# Patient Record
Sex: FEMALE | Employment: UNEMPLOYED | URBAN - METROPOLITAN AREA
[De-identification: names, ages, dates, MRNs, and addresses within clinical notes are randomized per-mention and may not be internally consistent; named-entity substitution may affect disease eponyms.]

---

## 2018-08-06 ENCOUNTER — HOSPITAL ENCOUNTER (INPATIENT)
Facility: CLINIC | Age: 28
LOS: 1 days | Discharge: LEFT BEFORE DISCHARGE | DRG: 894 | End: 2018-08-07
Attending: EMERGENCY MEDICINE | Admitting: PSYCHIATRY & NEUROLOGY

## 2018-08-06 DIAGNOSIS — F15.20 METHAMPHETAMINE DEPENDENCE (H): ICD-10-CM

## 2018-08-06 DIAGNOSIS — F11.20 SEVERE HEROIN DEPENDENCE (H): ICD-10-CM

## 2018-08-06 PROBLEM — F19.20 CHEMICAL DEPENDENCY (H): Status: ACTIVE | Noted: 2018-08-06

## 2018-08-06 LAB
AMPHETAMINES UR QL SCN: POSITIVE
BARBITURATES UR QL: NEGATIVE
BENZODIAZ UR QL: NEGATIVE
CANNABINOIDS UR QL SCN: POSITIVE
COCAINE UR QL: NEGATIVE
ETHANOL UR QL SCN: NEGATIVE
HCG UR QL: NEGATIVE
OPIATES UR QL SCN: POSITIVE

## 2018-08-06 PROCEDURE — 12800012 ZZH R&B CD MH INTERMEDIATE ADULT

## 2018-08-06 PROCEDURE — 80307 DRUG TEST PRSMV CHEM ANLYZR: CPT | Performed by: EMERGENCY MEDICINE

## 2018-08-06 PROCEDURE — 99284 EMERGENCY DEPT VISIT MOD MDM: CPT | Mod: Z6 | Performed by: EMERGENCY MEDICINE

## 2018-08-06 PROCEDURE — 99285 EMERGENCY DEPT VISIT HI MDM: CPT | Mod: 25 | Performed by: EMERGENCY MEDICINE

## 2018-08-06 PROCEDURE — 80320 DRUG SCREEN QUANTALCOHOLS: CPT | Performed by: EMERGENCY MEDICINE

## 2018-08-06 PROCEDURE — 81025 URINE PREGNANCY TEST: CPT | Performed by: EMERGENCY MEDICINE

## 2018-08-06 RX ORDER — TRAZODONE HYDROCHLORIDE 50 MG/1
50 TABLET, FILM COATED ORAL
Status: DISCONTINUED | OUTPATIENT
Start: 2018-08-06 | End: 2018-08-07 | Stop reason: HOSPADM

## 2018-08-06 RX ORDER — BISACODYL 10 MG
10 SUPPOSITORY, RECTAL RECTAL DAILY PRN
Status: DISCONTINUED | OUTPATIENT
Start: 2018-08-06 | End: 2018-08-07 | Stop reason: HOSPADM

## 2018-08-06 RX ORDER — ALUMINA, MAGNESIA, AND SIMETHICONE 2400; 2400; 240 MG/30ML; MG/30ML; MG/30ML
30 SUSPENSION ORAL EVERY 4 HOURS PRN
Status: DISCONTINUED | OUTPATIENT
Start: 2018-08-06 | End: 2018-08-07 | Stop reason: HOSPADM

## 2018-08-06 RX ORDER — ACETAMINOPHEN 325 MG/1
650 TABLET ORAL EVERY 4 HOURS PRN
Status: DISCONTINUED | OUTPATIENT
Start: 2018-08-06 | End: 2018-08-07 | Stop reason: HOSPADM

## 2018-08-06 RX ORDER — IBUPROFEN 600 MG/1
600 TABLET, FILM COATED ORAL EVERY 6 HOURS PRN
Status: DISCONTINUED | OUTPATIENT
Start: 2018-08-06 | End: 2018-08-07 | Stop reason: HOSPADM

## 2018-08-06 RX ORDER — HYDROXYZINE HYDROCHLORIDE 25 MG/1
25 TABLET, FILM COATED ORAL EVERY 4 HOURS PRN
Status: DISCONTINUED | OUTPATIENT
Start: 2018-08-06 | End: 2018-08-07 | Stop reason: HOSPADM

## 2018-08-06 RX ORDER — ONDANSETRON 4 MG/1
4 TABLET, ORALLY DISINTEGRATING ORAL EVERY 6 HOURS PRN
Status: DISCONTINUED | OUTPATIENT
Start: 2018-08-06 | End: 2018-08-07 | Stop reason: HOSPADM

## 2018-08-06 ASSESSMENT — ACTIVITIES OF DAILY LIVING (ADL)
GROOMING: INDEPENDENT;HANDWASHING
ORAL_HYGIENE: INDEPENDENT

## 2018-08-06 ASSESSMENT — ENCOUNTER SYMPTOMS: PSYCHIATRIC NEGATIVE: 1

## 2018-08-06 NOTE — ED NOTES
ED to Behavioral Floor Handoff    SITUATION  Zuly Pearson is a 28 year old female who speaks English and lives in a home unknown The patient arrived in the ED by private car from home with a complaint of Addiction Problem (Detox from meth and heroin)  .The patient's current symptoms started/worsened 1 year(s) ago and during this time the symptoms have increased.   In the ED, pt was diagnosed with   Final diagnoses:   Severe heroin dependence (H)   Methamphetamine dependence (H)        Initial vitals were: BP: 128/77  Pulse: 79  Temp: 96.4  F (35.8  C)  Resp: 12  Weight: 88 kg (194 lb 1.6 oz)  SpO2: 98 %   --------  Is the patient diabetic? No   If yes, last blood glucose? --     If yes, was this treated in the ED? --  --------  Is the patient inebriated (ETOH) No or Impaired on other substances? Yes IV Meth, IV Heroin  MSSA done? N/A  Last MSSA score: --    Were withdrawal symptoms treated? N/A  Does the patient have a seizure history? No. If yes, date of most recent seizure--  --------  Is the patient patient experiencing suicidal ideation? denies current or recent suicidal ideation     Homicidal ideation? denies current or recent homicidal ideation or behaviors.    Self-injurious behavior/urges? denies current or recent self injurious behavior or ideation.  ------  Was pt aggressive in the ED No  Was a code called No  Is the pt now cooperative? Yes  -------  Meds given in ED: Medications - No data to display   Family present during ED course? No  Family currently present? No    BACKGROUND  Does the patient have a cognitive impairment or developmental disability? No  Allergies: No Known Allergies.   Social demographics are   Social History     Social History     Marital status: Single     Spouse name: N/A     Number of children: N/A     Years of education: N/A     Social History Main Topics     Smoking status: Current Every Day Smoker     Packs/day: 0.50     Smokeless tobacco: Never Used     Alcohol use No      Drug use: Yes     Special: Methamphetamines, Opiates, Marijuana, IV     Sexual activity: Not Asked     Other Topics Concern     None     Social History Narrative     None        ASSESSMENT  Labs results   Labs Ordered and Resulted from Time of ED Arrival Up to the Time of Departure from the ED   DRUG ABUSE SCREEN 6 CHEM DEP URINE ([facility]) - Abnormal; Notable for the following:        Result Value    Amphetamine Qual Urine Positive (*)     Cannabinoids Qual Urine Positive (*)     Opiates Qualitative Urine Positive (*)     All other components within normal limits   HCG QUALITATIVE URINE      Imaging Studies: No results found for this or any previous visit (from the past 24 hour(s)).   Most recent vital signs /77  Pulse 79  Temp 96.4  F (35.8  C) (Oral)  Resp 12  Wt 88 kg (194 lb 1.6 oz)  SpO2 98%   Abnormal labs/tests/findings requiring intervention:---   Pain control: pt had none  Nausea control: pt had none    RECOMMENDATION  Are any infection precautions needed (MRSA, VRE, etc.)? No If yes, what infection? --  ---  Does the patient have mobility issues? independently. If yes, what device does the pt use? ---  ---  Is patient on 72 hour hold or commitment? No If on 72 hour hold, have hold and rights been given to patient? N/A  Are admitting orders written if after 10 p.m. ?N/A  Tasks needing to be completed:---     Johny Amador   [phone] [place] [facility]   [phone] [facility]

## 2018-08-06 NOTE — ED PROVIDER NOTES
History     Chief Complaint   Patient presents with     Addiction Problem     Detox from meth and heroin     HPI  Zuly Pearson is a 28 year old female who presents to the ED seeking detox for IV heroin dependence.  She says she has been using daily over the past year.  She uses about 1/2 gm IV heroin daily.  Last use prior to arrival.  She also uses IV meth but not a lot per patient, and not every day.  She has hx of adhd but is not on meds.  She denies si/hi.  She says she was sober in the past when she was pregnant.      I have reviewed the Medications, Allergies, Past Medical and Surgical History, and Social History in the Epic system.    Review of Systems   Psychiatric/Behavioral: Negative.    All other systems reviewed and are negative.      Physical Exam   BP: 128/77  Pulse: 79  Temp: 96.4  F (35.8  C)  Resp: 12  Weight: 88 kg (194 lb 1.6 oz)  SpO2: 98 %      Physical Exam   Constitutional: She is oriented to person, place, and time. She appears well-developed and well-nourished. No distress.   sleepy   HENT:   Head: Normocephalic and atraumatic.   Right Ear: External ear normal.   Left Ear: External ear normal.   Nose: Nose normal.   Eyes: EOM are normal. Pupils are equal, round, and reactive to light.   Neck: Normal range of motion. Neck supple.   Cardiovascular: Normal rate, regular rhythm and normal heart sounds.    Pulmonary/Chest: Effort normal and breath sounds normal.   Abdominal: Soft. There is no tenderness.   Musculoskeletal: Normal range of motion.   Neurological: She is alert and oriented to person, place, and time.   Skin: She is not diaphoretic.   Healing abscess left forearm, no cellulitis.    Psychiatric: She has a normal mood and affect. Her speech is normal. Judgment and thought content normal. She is slowed (sleepy). Cognition and memory are normal.   Nursing note and vitals reviewed.      ED Course     ED Course     Procedures           Labs Ordered and Resulted from Time of ED  Arrival Up to the Time of Departure from the ED   HCG QUALITATIVE URINE   DRUG ABUSE SCREEN 6 CHEM DEP URINE ([facility])            Assessments & Plan (with Medical Decision Making)   The patient presents to the ED seeking detox for IV heroin and meth dependence.  She denies si/hi.  She has a small abscess healing left forearm that I would not open.  It appears it will heal well on its own.  No cellulitis.  She is a voluntary admit to detox. She is medically cleared for admission.     I have reviewed the nursing notes.    I have reviewed the findings, diagnosis, plan and need for follow up with the patient.    New Prescriptions    No medications on file       Final diagnoses:   Severe heroin dependence (H)   Methamphetamine dependence (H)       8/6/2018   [facility], [place], EMERGENCY DEPARTMENT     Betsy Ford MD  08/06/18 1700

## 2018-08-07 VITALS
SYSTOLIC BLOOD PRESSURE: 118 MMHG | TEMPERATURE: 98.4 F | WEIGHT: 193 LBS | HEART RATE: 85 BPM | OXYGEN SATURATION: 100 % | DIASTOLIC BLOOD PRESSURE: 70 MMHG | BODY MASS INDEX: 28.58 KG/M2 | HEIGHT: 69 IN | RESPIRATION RATE: 16 BRPM

## 2018-08-07 LAB
ALBUMIN SERPL-MCNC: 3.9 G/DL (ref 3.4–5)
ALP SERPL-CCNC: 114 U/L (ref 40–150)
ALT SERPL W P-5'-P-CCNC: 37 U/L (ref 0–50)
ANION GAP SERPL CALCULATED.3IONS-SCNC: 6 MMOL/L (ref 3–14)
AST SERPL W P-5'-P-CCNC: 26 U/L (ref 0–45)
BILIRUB SERPL-MCNC: 0.7 MG/DL (ref 0.2–1.3)
BUN SERPL-MCNC: 11 MG/DL (ref 7–30)
CALCIUM SERPL-MCNC: 9.4 MG/DL (ref 8.5–10.1)
CHLORIDE SERPL-SCNC: 103 MMOL/L (ref 94–109)
CO2 SERPL-SCNC: 29 MMOL/L (ref 20–32)
CREAT SERPL-MCNC: 0.7 MG/DL (ref 0.52–1.04)
ERYTHROCYTE [DISTWIDTH] IN BLOOD BY AUTOMATED COUNT: 13 % (ref 10–15)
GFR SERPL CREATININE-BSD FRML MDRD: >90 ML/MIN/1.7M2
GLUCOSE SERPL-MCNC: 108 MG/DL (ref 70–99)
HCT VFR BLD AUTO: 47.1 % (ref 35–47)
HGB BLD-MCNC: 15.4 G/DL (ref 11.7–15.7)
MCH RBC QN AUTO: 29.8 PG (ref 26.5–33)
MCHC RBC AUTO-ENTMCNC: 32.7 G/DL (ref 31.5–36.5)
MCV RBC AUTO: 91 FL (ref 78–100)
PLATELET # BLD AUTO: 263 10E9/L (ref 150–450)
POTASSIUM SERPL-SCNC: 3.8 MMOL/L (ref 3.4–5.3)
PROT SERPL-MCNC: 8.6 G/DL (ref 6.8–8.8)
RBC # BLD AUTO: 5.16 10E12/L (ref 3.8–5.2)
SODIUM SERPL-SCNC: 138 MMOL/L (ref 133–144)
T4 FREE SERPL-MCNC: 0.91 NG/DL (ref 0.76–1.46)
TSH SERPL DL<=0.005 MIU/L-ACNC: 0.12 MU/L (ref 0.4–4)
WBC # BLD AUTO: 7.5 10E9/L (ref 4–11)

## 2018-08-07 PROCEDURE — 25000132 ZZH RX MED GY IP 250 OP 250 PS 637: Performed by: PSYCHIATRY & NEUROLOGY

## 2018-08-07 PROCEDURE — HZ2ZZZZ DETOXIFICATION SERVICES FOR SUBSTANCE ABUSE TREATMENT: ICD-10-PCS | Performed by: PSYCHIATRY & NEUROLOGY

## 2018-08-07 PROCEDURE — 80053 COMPREHEN METABOLIC PANEL: CPT | Performed by: PSYCHIATRY & NEUROLOGY

## 2018-08-07 PROCEDURE — 36415 COLL VENOUS BLD VENIPUNCTURE: CPT | Performed by: PSYCHIATRY & NEUROLOGY

## 2018-08-07 PROCEDURE — 84439 ASSAY OF FREE THYROXINE: CPT | Performed by: PSYCHIATRY & NEUROLOGY

## 2018-08-07 PROCEDURE — 85027 COMPLETE CBC AUTOMATED: CPT | Performed by: PSYCHIATRY & NEUROLOGY

## 2018-08-07 PROCEDURE — 99207 ZZC CONSULT E&M CHANGED TO INITIAL LEVEL: CPT | Performed by: PHYSICIAN ASSISTANT

## 2018-08-07 PROCEDURE — 87389 HIV-1 AG W/HIV-1&-2 AB AG IA: CPT | Performed by: PSYCHIATRY & NEUROLOGY

## 2018-08-07 PROCEDURE — 99207 ZZC CDG-CODE CATEGORY CHANGED: CPT | Performed by: PSYCHIATRY & NEUROLOGY

## 2018-08-07 PROCEDURE — 99221 1ST HOSP IP/OBS SF/LOW 40: CPT | Performed by: PHYSICIAN ASSISTANT

## 2018-08-07 PROCEDURE — 84443 ASSAY THYROID STIM HORMONE: CPT | Performed by: PSYCHIATRY & NEUROLOGY

## 2018-08-07 PROCEDURE — 87522 HEPATITIS C REVRS TRNSCRPJ: CPT | Performed by: PSYCHIATRY & NEUROLOGY

## 2018-08-07 PROCEDURE — 99234 HOSP IP/OBS SM DT SF/LOW 45: CPT | Performed by: PSYCHIATRY & NEUROLOGY

## 2018-08-07 PROCEDURE — 86803 HEPATITIS C AB TEST: CPT | Performed by: PSYCHIATRY & NEUROLOGY

## 2018-08-07 RX ORDER — BUPRENORPHINE 2 MG/1
2 TABLET SUBLINGUAL 4 TIMES DAILY PRN
Status: DISCONTINUED | OUTPATIENT
Start: 2018-08-07 | End: 2018-08-07 | Stop reason: HOSPADM

## 2018-08-07 RX ORDER — NALOXONE HYDROCHLORIDE 0.4 MG/ML
.1-.4 INJECTION, SOLUTION INTRAMUSCULAR; INTRAVENOUS; SUBCUTANEOUS
Status: DISCONTINUED | OUTPATIENT
Start: 2018-08-07 | End: 2018-08-07 | Stop reason: HOSPADM

## 2018-08-07 RX ADMIN — IBUPROFEN 600 MG: 600 TABLET, FILM COATED ORAL at 07:16

## 2018-08-07 RX ADMIN — ACETAMINOPHEN 650 MG: 325 TABLET, FILM COATED ORAL at 08:51

## 2018-08-07 ASSESSMENT — ACTIVITIES OF DAILY LIVING (ADL)
ORAL_HYGIENE: INDEPENDENT
GROOMING: INDEPENDENT
DRESS: INDEPENDENT

## 2018-08-07 NOTE — PLAN OF CARE
Behavioral Team Discussion: (8/7/2018)    Continued Stay Criteria/Rationale: Patient admitted for Chemical Use Issues.  Plan: The following services will be provided to the patient; psychiatric assessment, medication management, therapeutic milieu, individual and group support, and skills groups.   Participants: 3A Provider: Dr. Laya Zambrano MD; 3A RN's: Ray Tracy, RN and Deborah Gregorio, RN; 3A CM's: Fina Lewis, Simin Vallejo  and Luca Sandoval.  Summary/Recommendation: Providers will assess today for treatment recommendations, discharge planning, and aftercare plans. CM will meet with pt for discharge planning.   Medical/Physical: Deferred (see medical notes).  Precautions:   Behavioral Orders   Procedures     Code 1 - Restrict to Unit     Routine Programming     As clinically indicated     Status 15     Every 15 minutes.     Withdrawal precautions     Rationale for change in precautions or plan: N/A  Progress: Improving.

## 2018-08-07 NOTE — H&P
"Admitted:     08/06/2018      IDENTIFYING INFORMATION:  The patient is a 28-year-old single female.  She has 2 kids.  CPS is involved.  She is homeless, unemployed.      CHIEF COMPLAINT:  Heroin.      HISTORY OF PRESENT ILLNESS:  The patient came here wanting detox from heroin.  She apparently is also in treatment and they sent her here.  The patient is a poor historian, mumbles answers.  She started using meth and heroin at age 25.  She has tolerance, withdrawal, progressive use with loss of control, spent more time, more amount, tried to cut down unsuccessfully use despite having CPS involved.  The patient has a healing abscess on her left toe.  She is more focused on talking with her 4-year-old and 6-year-old child.  She is very guarded, gives short answers, has poor eye contact.  She smokes 1 pack a day, does not use alcohol, does not puri.  Denies any mental health issues except she has history of ADHD, does not have any suicidal or homicidal ideation.  Does not have auditory or visual hallucinations.      PAST PSYCHIATRIC HISTORY:  Psychiatrically hospitalized once at the age of 17, but not able to tell me any questions.  For the majority of the answers, she says, \"I don't know.\"      PAST MEDICAL HISTORY:  A 10-point system reviewed and is significant for pain in her foot.      Vital signs are as below.      VITAL SIGNS:  Temperature of 97.8, pulse of 83, respiratory rate 16, blood pressure of 131/71.      FAMILY HISTORY:  The patient states, \"I don't know.\"      SOCIAL HISTORY:  She says \"I don't know\", but she has a high school education.  She worked as a PCA.      MENTAL STATUS EXAMINATION:  The patient is a 28-year-old female who was disheveled with poor grooming, poor hygiene, poor eye contact.  Mood is tired.  Affect is congruent.  Speech is less spontaneous, reduced in volume, less logical in thinking, no loose associations.  Insight and judgment are limited.  Does not have any suicidal or homicidal " ideation.  Is alert, oriented x3.  Recent and remote memory, language, fund of knowledge are all less than adequate.      DIAGNOSES:   Axis I:  Opiate use disorder, severe.  Methamphetamine use disorder.      PLAN:  The patient will detox off opiates using subutex will be seen by internal medicine and cm.  The patient will be seen by case management and Internal Medicine.         CHADWICK BORGES MD             D: 2018   T: 2018   MT: MARY ANN      Name:     VIK MACIAS   MRN:      [id number]        Account:      [id number]   :      1990        Admitted:     2018                   Document: [id number]

## 2018-08-07 NOTE — CONSULTS
"  Internal Medicine Initial Visit    Zuly Pearson MRN# [id number]   Age: 28 year old YOB: 1990   Date of Admission: 2018     Reason for consult: Evaluate Left Forearm and Bilateral Foot Sores       Requesting physician Dr. Benavides      SUBJECTIVE   HPI:   Zuly Pearson is a 28 year old female with a history of polysubstance abuse and ADHD who is admitted to  for opiate and methamphetamine detoxification. Prior to admission, she was using unknown quantities of IV heroin and methamphetamine daily. Current withdrawal symptoms include rhinorrhea, cold flashes, and back pain. Medicine was asked to evaluate left forearm injection site and bilateral feet. Ms. Pearson reports she \"messed up\" her feet \"a long time ago.\" She has been seen by Podiatry in the past, but did not take any of the medications she was prescribed. She is currently refusing any medications or outpatient Podiatry referral. Denies fevers, HA, dizziness, chest pain, SOB, n/v/c/d, abdominal pain, dysuria, or peripheral edema.     Past Medical History:     Past Medical History:   Diagnosis Date     ADHD      Substance abuse     Opiates, Methamphetamine      Reviewed & updated in [facility].     Past Surgical History:      Past Surgical History:   Procedure Laterality Date      SECTION       CHOLECYSTECTOMY        Reviewed & updated in Epic.     Medications prior to admission:   None     Allergies:   No Known Allergies      Social History:   Current 1 ppd or less smoker.  Denies any alcohol use.  Illicit drug use as detailed in HPI.     Family History:   Reviewed and unknown to patient.     Review of Systems:   Ten point review of systems negative except as stated above in History of Present Illness.    OBJECTIVE   Physical Exam:   Blood pressure 121/71, pulse 83, temperature 97.8  F (36.6  C), temperature source Oral, resp. rate 16, height 1.753 m (5' 9\"), weight 87.5 kg (193 lb), SpO2 100 %.  General: Awake. Non-toxic " appearing. NAD.  HEENT: NC/AT. Anicteric sclera. Eyes symmetric and free of discharge bilaterally. Mucous membranes moist.  Cardiovascular: RRR with S1,S2.  Lungs: Normal respiratory effort on RA. Lungs CTAB without wheezes, rales, or rhonchi.  Abdomen: Soft and nondistended with bowel sounds present.  Extremities: Warm & well perfused. No peripheral edema. Thickened and hardened toenails with yellowish discoloration. Several corns and calluses on the soles of both feet.   Skin: Injection site on left forearm with small raised hardened lesion with overlying scab; no redness, fluctuance, or drainage.  Neurologic: A&O X 3. Answers questions appropriately. Moves all extremities symmetrically.     Laboratory Data:   CMP  Recent Labs  Lab 08/07/18  0819      POTASSIUM 3.8   CHLORIDE 103   CO2 29   ANIONGAP 6   *   BUN 11   CR 0.70   GFRESTIMATED >90   GFRESTBLACK >90   EDE 9.4   PROTTOTAL 8.6   ALBUMIN 3.9   BILITOTAL 0.7   ALKPHOS 114   AST 26   ALT 37       CBC  Recent Labs  Lab 08/07/18  0819   WBC 7.5   RBC 5.16   HGB 15.4   HCT 47.1*   MCV 91   MCH 29.8   MCHC 32.7   RDW 13.0          TSH  Recent Labs  Lab 08/07/18  0819   TSH 0.12*        Assessment and Plan/Recommendations:   Zuly Pearson is a 28 year old female with a history of polysubstance abuse and ADHD who is admitted to  for opiate and methamphetamine detoxification.    Polysubstance Abuse and Withdrawal - Management per Psychiatry.    Healing Left Forearm Abscess - No active drainage or evidence of cellulitis. Evaluated by ED provider who did not recommend drainage.  - No indication for antibiotics at this time.  - Notify Medicine if fever >101, redness, drainage, or increased swelling develop.    Onychomycosis, Corns, and Calluses of Bilateral Feet - No evidence of infection. Refusing topical medications or OP Podiatry referral.    Subclinical Hyperthyroidism - TSH 0.12, FT4 0.91 on admission. Likely d/t sick euthyroid  syndrome. Repeat TFTs in 4-8 weeks per PCP.    Medicine will sign off. Please page the Internal Medicine job code pager for any intercurrent medical issues which arise.    Mirian English PA-C  Hospitalist Service  [phone]

## 2018-08-07 NOTE — PLAN OF CARE
"Problem: Substance Withdrawal  Goal: Substance Withdrawal  Signs and symptoms of listed problems will be absent or manageable.  1.  Detox from Opiates   2.  Meet with CM to discuss treatment planning.  3.  Physical exam and Lab evaluation        Outcome: Declining  Opiate Withdrawal   S: Pt presents via [facility] for Opiate detox.   B: Pt has agapito using 1/2 gm heroin IV daily for 4-6 months last use 8/6/18 at 0940 am. She is also using meth last use 8/6/18 @ 0700.  Pt denies any acute or chronic medical issues, however PA reported pt said it was difficult for her to walk and noted sores on her feet during search. Pt reports one  admission age 17 \"I don't know what it was for.\" She reports no history of CD treatments, no detox admissions.  Pt is disheveled , has body odor and was encouraged to shower but declined and would not allow me to look at her feet.  \"No I don't want you looking at my feet.\"  I explained that internal medicine will need to assess any sore she may have tomorrow and she said \"ok they can look at it tomorrow then.\" She does have a healing abscess on her (L) forearm that is scabbed over. She is depressed, tearful and hopeless related to her use, homelessness and loss of her 4 & 6 year old children.  She reports CPS involvement and children are with grandparents. She endorses depression but no thoughts of self harm.  She is guarded, gives short answers, soft spoke and has poor eye contact. She was cooperative and indicated that she had a Rule 25 done at [facility] and is interested in treatment. She reports taking no mediations and \"I don't want any.\"   A: Pt is not yet in withdrawal, she is depressed but no SI and is able to contract for safety.   R: Monitor per opiate scale. Comfort med's as needed. Will need to be seen by internal medicine to evaluate feet if patient allows.        "

## 2018-08-07 NOTE — PROGRESS NOTES
Case Management Note  8/7/2018    Writer met with pt to initiate discharge planning. Pt reports she recently had a Rule 25 assessment completed at [facility] and been referred to Maryann Dickinson. Pt signed ADELAIDE's for [facility] and Maryann Dickinson. Writer faxed ADELAIDE to [facility] requesting a copy of her recently completed Rule 25 assessment.    Luca Sandoval MA, LADC

## 2018-08-07 NOTE — DISCHARGE SUMMARY
Admit Date:     2018   Discharge Date:           HOSPITAL COURSE:  Please see detailed admission note by Dr. Zambrano done today.  The patient came here wanting detox from opiates.  However, within a couple of hours of staying, she wanted to leave.  Ideally, I would like for her to get detoxification, get assessment, but she does not want to do so.  During the hospitalization, the patient did see Sandra English.  Please review the detailed note by Sandra English on 2018.  During the hospitalization, the patient had lab work done which shows beta hCG negative.  CBC, CMP is normal.  Ideally, I would like for her to finish the taper, go there, but she does not want to stay, she wants to be discharged.  This is not ideal.  The patient is not holdable.  She is not suicidal.  She is not homicidal.  She has future goals.  She wants to be there with her family and wants to get help and wants to go to treatment, but she has not completed detoxification.  Does not know if she wants to be on Suboxone maintenance.  She will be discharged against medical advice.  The patient's prognosis is guarded. pT HAS A DX OF HEPATITIS C phone number that patient gave to contact is [phone], this number does not work, made attempts to contact pt      Axis I:  Opiate use disorder, severe.  Methamphetamine use disorder.   CHADWICK ZAMBRANO MD             D: 2018   T: 2018   MT: HIMA      Name:     VIK MACIAS   MRN:      [id number]        Account:        [id number]   :      1990           Admit Date:     2018                                  Discharge Date:       Document: [id number]

## 2018-08-08 LAB
HCV AB SERPL QL IA: REACTIVE
HIV 1+2 AB+HIV1 P24 AG SERPL QL IA: NONREACTIVE

## 2018-08-09 LAB
HCV RNA SERPL NAA+PROBE-ACNC: NORMAL [IU]/ML
HCV RNA SERPL NAA+PROBE-LOG IU: NORMAL LOG IU/ML
